# Patient Record
Sex: FEMALE | Race: BLACK OR AFRICAN AMERICAN | URBAN - METROPOLITAN AREA
[De-identification: names, ages, dates, MRNs, and addresses within clinical notes are randomized per-mention and may not be internally consistent; named-entity substitution may affect disease eponyms.]

---

## 2020-06-30 ENCOUNTER — OFFICE VISIT (OUTPATIENT)
Dept: URBAN - METROPOLITAN AREA CLINIC 92 | Facility: CLINIC | Age: 65
End: 2020-06-30

## 2023-01-06 ENCOUNTER — WEB ENCOUNTER (OUTPATIENT)
Dept: URBAN - METROPOLITAN AREA CLINIC 92 | Facility: CLINIC | Age: 68
End: 2023-01-06

## 2023-01-06 ENCOUNTER — OFFICE VISIT (OUTPATIENT)
Dept: URBAN - METROPOLITAN AREA CLINIC 92 | Facility: CLINIC | Age: 68
End: 2023-01-06
Payer: MEDICARE

## 2023-01-06 ENCOUNTER — TELEPHONE ENCOUNTER (OUTPATIENT)
Dept: URBAN - METROPOLITAN AREA CLINIC 92 | Facility: CLINIC | Age: 68
End: 2023-01-06

## 2023-01-06 VITALS
DIASTOLIC BLOOD PRESSURE: 87 MMHG | WEIGHT: 251.8 LBS | BODY MASS INDEX: 39.52 KG/M2 | HEIGHT: 67 IN | HEART RATE: 75 BPM | SYSTOLIC BLOOD PRESSURE: 152 MMHG | TEMPERATURE: 96.8 F

## 2023-01-06 DIAGNOSIS — R15.1 FECAL SMEARING: ICD-10-CM

## 2023-01-06 DIAGNOSIS — R19.7 DIARRHEA, UNSPECIFIED TYPE: ICD-10-CM

## 2023-01-06 DIAGNOSIS — Z86.19 HISTORY OF HEPATITIS C: ICD-10-CM

## 2023-01-06 PROCEDURE — 99204 OFFICE O/P NEW MOD 45 MIN: CPT

## 2023-01-06 RX ORDER — GABAPENTIN 800 MG/1
TAKE 1 TABLET (800 MG) BY ORAL ROUTE 3 TIMES PER DAY TABLET, FILM COATED ORAL
Qty: 0 | Refills: 0 | Status: ACTIVE | COMMUNITY
Start: 1900-01-01

## 2023-01-06 RX ORDER — FUROSEMIDE 20 MG/1
1 TABLET TABLET ORAL ONCE A DAY
Status: ACTIVE | COMMUNITY

## 2023-01-06 RX ORDER — TRAMADOL HYDROCHLORIDE 50 MG/1
TAKE 1 TABLET BY ORAL ROUTE EVERY 8 HOURS AS NEEDED TABLET ORAL
Qty: 0 | Refills: 0 | Status: ACTIVE | COMMUNITY
Start: 1900-01-01

## 2023-01-06 RX ORDER — RIVAROXABAN 20 MG/1
1 TABLET WITH FOOD TABLET, FILM COATED ORAL ONCE A DAY
Status: ACTIVE | COMMUNITY

## 2023-01-06 RX ORDER — LISINOPRIL 20 MG/1
TAKE 1 TABLET (20 MG) BY ORAL ROUTE ONCE DAILY TABLET ORAL 1
Qty: 0 | Refills: 0 | Status: ACTIVE | COMMUNITY
Start: 1900-01-01

## 2023-01-06 RX ORDER — HYDROXYZINE HYDROCHLORIDE 25 MG/1
TAKE 1 TABLET BY ORAL ROUTE DAILY TABLET, FILM COATED ORAL 1
Qty: 0 | Refills: 0 | Status: ACTIVE | COMMUNITY
Start: 1900-01-01

## 2023-01-06 RX ORDER — CHOLESTYRAMINE 4 G/9G
1 PACKET MIXED WITH WATER OR NON-CARBONATED DRINK POWDER, FOR SUSPENSION ORAL ONCE A DAY
Qty: 30 | Refills: 3 | OUTPATIENT
Start: 2023-01-06

## 2023-01-06 RX ORDER — APIXABAN 5 MG/1
TAKE 1 TABLET (5 MG) BY ORAL ROUTE 2 TIMES PER DAY TABLET, FILM COATED ORAL 2
Qty: 0 | Refills: 0 | Status: DISCONTINUED | COMMUNITY
Start: 1900-01-01

## 2023-01-06 NOTE — HPI-TODAY'S VISIT:
67-year-old female presents today with complaints of diarrhea and incontinence.    She states that back in June 2022 she had a colonoscopy in [place] and ever since then she has been having symptoms of diarrhea and incontinence.  She cannot tell me exactly how many bowel movements she is having but states she has been having to take 4 Imodium daily and even with this is having issues.  She denies any hematochezia, melena, nighttime symptoms, weight loss.  She states that she has fecal smearing almost every day and sometimes when she passes gas some stool will come out.  She also has a increased urgency and states all of her symptoms are getting worse over time.  She has not had any stool studies done for this.  She denies any abdominal pain, nausea, vomiting, fevers, chills.  She does note she had her gallbladder removed in 1995. June colonoscopy did demonstrate colon polyps unknown when she needs to f/u done with Dr. Kannan Nj in [place].   She does note she was diagnosed with a pancreatic mass by Dr. Kannan Nj in [place] and she is currently being monitored.  Not sure what type of mass this is but no surgical interventions have been done.  She also notes she has acid reflux and was told she had a ulcer that was found at her endoscopy that which was performed in June 2022.  She states she is currently on famotidine at bedtime and another medication that she takes in the morning.  She states she was to go back and have a repeat endoscopy performed but has yet to do so.  She denies any odynophagia, dysphagia, NSAID use, recent travel.  Of note she is currently on Xarelto due to 2 occurrences of DVT after surgical procedures.  She was following with Dr. Sandoval Rodney while she was in [place] but since she had to move back to [place] over the last few years has been following with someone up there.  She also was previously seen in 2019 by Dr. Jay and she has a history of hepatitis C that was treated with Harvoni in 2015.  Today she states she has not seen anybody for this since 2019.  She also notes she plans on staying in [place] now permanently

## 2023-01-09 LAB
IMMUNOGLOBULIN A, QN, SERUM: 142
INTERPRETATION: (no result)
T-TRANSGLUTAMINASE (TTG) IGA: <1
TSH W/REFLEX TO FT4: 2.11

## 2023-01-10 PROBLEM — 128302006: Status: ACTIVE | Noted: 2023-01-10

## 2023-01-10 PROBLEM — 443913008: Status: ACTIVE | Noted: 2023-01-10

## 2023-01-12 ENCOUNTER — OFFICE VISIT (OUTPATIENT)
Dept: URBAN - METROPOLITAN AREA CLINIC 86 | Facility: CLINIC | Age: 68
End: 2023-01-12

## 2023-01-12 RX ORDER — CHOLESTYRAMINE 4 G/9G
1 PACKET MIXED WITH WATER OR NON-CARBONATED DRINK POWDER, FOR SUSPENSION ORAL ONCE A DAY
Qty: 30 | Refills: 3 | Status: ACTIVE | COMMUNITY
Start: 2023-01-06

## 2023-01-12 RX ORDER — FUROSEMIDE 20 MG/1
1 TABLET TABLET ORAL ONCE A DAY
Status: ACTIVE | COMMUNITY

## 2023-01-12 RX ORDER — TRAMADOL HYDROCHLORIDE 50 MG/1
TAKE 1 TABLET BY ORAL ROUTE EVERY 8 HOURS AS NEEDED TABLET ORAL
Qty: 0 | Refills: 0 | Status: ACTIVE | COMMUNITY
Start: 1900-01-01

## 2023-01-12 RX ORDER — RIVAROXABAN 20 MG/1
1 TABLET WITH FOOD TABLET, FILM COATED ORAL ONCE A DAY
Status: ACTIVE | COMMUNITY

## 2023-01-12 RX ORDER — LISINOPRIL 20 MG/1
TAKE 1 TABLET (20 MG) BY ORAL ROUTE ONCE DAILY TABLET ORAL 1
Qty: 0 | Refills: 0 | Status: ACTIVE | COMMUNITY
Start: 1900-01-01

## 2023-01-12 RX ORDER — HYDROXYZINE HYDROCHLORIDE 25 MG/1
TAKE 1 TABLET BY ORAL ROUTE DAILY TABLET, FILM COATED ORAL 1
Qty: 0 | Refills: 0 | Status: ACTIVE | COMMUNITY
Start: 1900-01-01

## 2023-01-12 RX ORDER — GABAPENTIN 800 MG/1
TAKE 1 TABLET (800 MG) BY ORAL ROUTE 3 TIMES PER DAY TABLET, FILM COATED ORAL
Qty: 0 | Refills: 0 | Status: ACTIVE | COMMUNITY
Start: 1900-01-01

## 2023-01-12 NOTE — HPI-TODAY'S VISIT:
This is a 67 year old female, previously seen in our office but lost to follow up, who presents for evaluation of HCV.  A copy of the note will be sent to the referring provider  Pt treated for hcv in 2015 with harvoni and acheived svt. Last US in 2019 with homogenous liver, stable 4mm focus that was doubtful to be of clinical signficance

## 2023-03-13 ENCOUNTER — OFFICE VISIT (OUTPATIENT)
Dept: URBAN - METROPOLITAN AREA CLINIC 92 | Facility: CLINIC | Age: 68
End: 2023-03-13

## 2023-03-13 RX ORDER — TRAMADOL HYDROCHLORIDE 50 MG/1
TAKE 1 TABLET BY ORAL ROUTE EVERY 8 HOURS AS NEEDED TABLET ORAL
Qty: 0 | Refills: 0 | COMMUNITY
Start: 1900-01-01

## 2023-03-13 RX ORDER — RIVAROXABAN 20 MG/1
1 TABLET WITH FOOD TABLET, FILM COATED ORAL ONCE A DAY
COMMUNITY

## 2023-03-13 RX ORDER — LISINOPRIL 20 MG/1
TAKE 1 TABLET (20 MG) BY ORAL ROUTE ONCE DAILY TABLET ORAL 1
Qty: 0 | Refills: 0 | COMMUNITY
Start: 1900-01-01

## 2023-03-13 RX ORDER — CHOLESTYRAMINE 4 G/9G
1 PACKET MIXED WITH WATER OR NON-CARBONATED DRINK POWDER, FOR SUSPENSION ORAL ONCE A DAY
Qty: 30 | Refills: 3 | COMMUNITY
Start: 2023-01-06

## 2023-03-13 RX ORDER — CHOLESTYRAMINE 4 G/9G
1 PACKET MIXED WITH WATER OR NON-CARBONATED DRINK POWDER, FOR SUSPENSION ORAL ONCE A DAY
Qty: 30 | Refills: 3 | OUTPATIENT

## 2023-03-13 RX ORDER — FUROSEMIDE 20 MG/1
1 TABLET TABLET ORAL ONCE A DAY
COMMUNITY

## 2023-03-13 RX ORDER — HYDROXYZINE HYDROCHLORIDE 25 MG/1
TAKE 1 TABLET BY ORAL ROUTE DAILY TABLET, FILM COATED ORAL 1
Qty: 0 | Refills: 0 | COMMUNITY
Start: 1900-01-01

## 2023-03-13 RX ORDER — GABAPENTIN 800 MG/1
TAKE 1 TABLET (800 MG) BY ORAL ROUTE 3 TIMES PER DAY TABLET, FILM COATED ORAL
Qty: 0 | Refills: 0 | COMMUNITY
Start: 1900-01-01

## 2023-05-25 ENCOUNTER — OFFICE VISIT (OUTPATIENT)
Dept: URBAN - METROPOLITAN AREA MEDICAL CENTER 28 | Facility: MEDICAL CENTER | Age: 68
End: 2023-05-25

## 2024-01-29 ENCOUNTER — OFFICE VISIT (OUTPATIENT)
Dept: URBAN - METROPOLITAN AREA CLINIC 92 | Facility: CLINIC | Age: 69
End: 2024-01-29
Payer: MEDICARE

## 2024-01-29 VITALS
WEIGHT: 251 LBS | DIASTOLIC BLOOD PRESSURE: 95 MMHG | BODY MASS INDEX: 39.39 KG/M2 | TEMPERATURE: 96.8 F | HEIGHT: 67 IN | HEART RATE: 86 BPM | SYSTOLIC BLOOD PRESSURE: 164 MMHG

## 2024-01-29 DIAGNOSIS — R19.7 DIARRHEA, UNSPECIFIED TYPE: ICD-10-CM

## 2024-01-29 DIAGNOSIS — Z12.11 SCREENING FOR COLON CANCER: ICD-10-CM

## 2024-01-29 DIAGNOSIS — Z86.19 HISTORY OF HEPATITIS C: ICD-10-CM

## 2024-01-29 PROBLEM — 255046005: Status: ACTIVE | Noted: 2024-01-29

## 2024-01-29 PROCEDURE — 99213 OFFICE O/P EST LOW 20 MIN: CPT

## 2024-01-29 RX ORDER — TRAMADOL HYDROCHLORIDE 50 MG/1
TAKE 1 TABLET BY ORAL ROUTE EVERY 8 HOURS AS NEEDED TABLET ORAL
Qty: 0 | Refills: 0 | Status: ACTIVE | COMMUNITY
Start: 1900-01-01

## 2024-01-29 RX ORDER — LISINOPRIL 20 MG/1
TAKE 1 TABLET (20 MG) BY ORAL ROUTE ONCE DAILY TABLET ORAL 1
Qty: 0 | Refills: 0 | Status: ACTIVE | COMMUNITY
Start: 1900-01-01

## 2024-01-29 RX ORDER — GABAPENTIN 800 MG/1
TAKE 1 TABLET (800 MG) BY ORAL ROUTE 3 TIMES PER DAY TABLET, FILM COATED ORAL
Qty: 0 | Refills: 0 | Status: ACTIVE | COMMUNITY
Start: 1900-01-01

## 2024-01-29 RX ORDER — RIVAROXABAN 20 MG/1
1 TABLET WITH FOOD TABLET, FILM COATED ORAL ONCE A DAY
Status: ACTIVE | COMMUNITY

## 2024-01-29 RX ORDER — HYDROXYZINE HYDROCHLORIDE 25 MG/1
TAKE 1 TABLET BY ORAL ROUTE DAILY TABLET, FILM COATED ORAL 1
Qty: 0 | Refills: 0 | Status: ACTIVE | COMMUNITY
Start: 1900-01-01

## 2024-01-29 RX ORDER — FUROSEMIDE 20 MG/1
1 TABLET TABLET ORAL ONCE A DAY
Status: ACTIVE | COMMUNITY

## 2024-01-29 NOTE — HPI-TODAY'S VISIT:
1/6/23 67-year-old female presents today with complaints of diarrhea and incontinence.    She states that back in June 2022 she had a colonoscopy in [place] and ever since then she has been having symptoms of diarrhea and incontinence.  She cannot tell me exactly how many bowel movements she is having but states she has been having to take 4 Imodium daily and even with this is having issues.  She denies any hematochezia, melena, nighttime symptoms, weight loss.  She states that she has fecal smearing almost every day and sometimes when she passes gas some stool will come out.  She also has a increased urgency and states all of her symptoms are getting worse over time.  She has not had any stool studies done for this.  She denies any abdominal pain, nausea, vomiting, fevers, chills.  She does note she had her gallbladder removed in 1995. June colonoscopy did demonstrate colon polyps unknown when she needs to f/u done with Dr. Kannan Nj in [place].   She does note she was diagnosed with a pancreatic mass by Dr. Kannan Nj in [place] and she is currently being monitored.  Not sure what type of mass this is but no surgical interventions have been done.  She also notes she has acid reflux and was told she had a ulcer that was found at her endoscopy that which was performed in June 2022.  She states she is currently on famotidine at bedtime and another medication that she takes in the morning.  She states she was to go back and have a repeat endoscopy performed but has yet to do so.  She denies any odynophagia, dysphagia, NSAID use, recent travel.  Of note she is currently on Xarelto due to 2 occurrences of DVT after surgical procedures.  She was following with Dr. Sandoval Rodney while she was in [place] but since she had to move back to [place] over the last few years has been following with someone up there.  She also was previously seen in 2019 by Dr. Jay and she has a history of hepatitis C that was treated with Harvoni in 2015.  Today she states she has not seen anybody for this since 2019.  She also notes she plans on staying in [place] now permanently  1/29/24 Pt returns today after 1 year.  After last visit patient did not follow-up with liver center Labs demonstrated negative TSH and celiac. Stool study and manometry was not done. pt states that a few months after out appt she was dx with a UTI and was hospitalized. At that time she was given abx which resolved her diarrhea and incontinence. She is now having 1 BM daily with no diarrhea, constipation, hematochezia, melena, abd pain or weight loss.  She continues on Pepcid nightly for her gerd which helps.  Still have not received her old records from GI in [place].   She is following with Dr. Sandoval Rodney for diagnosis of neuroendocrine carcinoma of the pancreas. His note indicates that patient was originally diagnosed in 2021 with a CT scan that demonstrated incidental mass in the head of the pancreas 1.2 cm. She had a recent MRI in December that demonstrated a stable 1.1 cm mass in the pancreas. She was referred to Dr. Glynn for opinion on operative management. They will be continuing survey every 6 months with an MRI.

## 2024-01-30 ENCOUNTER — OFFICE VISIT (OUTPATIENT)
Dept: URBAN - METROPOLITAN AREA CLINIC 86 | Facility: CLINIC | Age: 69
End: 2024-01-30
Payer: MEDICARE

## 2024-01-30 ENCOUNTER — DASHBOARD ENCOUNTERS (OUTPATIENT)
Age: 69
End: 2024-01-30

## 2024-01-30 VITALS
TEMPERATURE: 97.2 F | HEIGHT: 67 IN | HEART RATE: 85 BPM | SYSTOLIC BLOOD PRESSURE: 135 MMHG | BODY MASS INDEX: 39.79 KG/M2 | WEIGHT: 253.5 LBS | DIASTOLIC BLOOD PRESSURE: 81 MMHG

## 2024-01-30 DIAGNOSIS — D3A.8 NEUROENDOCRINE TUMOR: ICD-10-CM

## 2024-01-30 DIAGNOSIS — B18.2 CHRONIC HEPATITIS C WITHOUT HEPATIC COMA: ICD-10-CM

## 2024-01-30 PROCEDURE — 99214 OFFICE O/P EST MOD 30 MIN: CPT | Performed by: PHYSICIAN ASSISTANT

## 2024-01-30 RX ORDER — HYDROXYZINE HYDROCHLORIDE 25 MG/1
TAKE 1 TABLET BY ORAL ROUTE DAILY TABLET, FILM COATED ORAL 1
Qty: 0 | Refills: 0 | Status: ACTIVE | COMMUNITY
Start: 1900-01-01

## 2024-01-30 RX ORDER — FUROSEMIDE 20 MG/1
1 TABLET TABLET ORAL ONCE A DAY
Status: ACTIVE | COMMUNITY

## 2024-01-30 RX ORDER — GABAPENTIN 800 MG/1
TAKE 1 TABLET (800 MG) BY ORAL ROUTE 3 TIMES PER DAY TABLET, FILM COATED ORAL
Qty: 0 | Refills: 0 | Status: ACTIVE | COMMUNITY
Start: 1900-01-01

## 2024-01-30 RX ORDER — LISINOPRIL 20 MG/1
TAKE 1 TABLET (20 MG) BY ORAL ROUTE ONCE DAILY TABLET ORAL 1
Qty: 0 | Refills: 0 | Status: ACTIVE | COMMUNITY
Start: 1900-01-01

## 2024-01-30 RX ORDER — TRAMADOL HYDROCHLORIDE 50 MG/1
TAKE 1 TABLET BY ORAL ROUTE EVERY 8 HOURS AS NEEDED TABLET ORAL
Qty: 0 | Refills: 0 | Status: ACTIVE | COMMUNITY
Start: 1900-01-01

## 2024-01-30 RX ORDER — RIVAROXABAN 20 MG/1
1 TABLET WITH FOOD TABLET, FILM COATED ORAL ONCE A DAY
Status: ACTIVE | COMMUNITY

## 2024-01-30 NOTE — HPI-TODAY'S VISIT:
This is a 67 year old female, previously seen in our office but lost to follow up, who presents for evaluation of HCV.  A copy of the note will be sent to the referring provider  Pt treated for hcv in 2015 with harvoni and dulce cevallos. Last US in 2019 with homogenous liver, stable 4mm focus that was doubtful to be of clinical signficance  12/2023 mri FINDINGS: Lower Thorax: Normal. Liver: No fat or iron. Normal.  Gallbladder/Biliary Tree: Status post cholecystectomy. No biliary ductal dilatation. Spleen: Normal. Pancreas: Well-circumscribed arterially enhancing lesion superior pancreatic head measuring 1.1 x 1.0 cm (11:40). No additional focal suspicious lesion. No main ductal dilatation. Adrenal Glands: Normal.  Kidneys/Ureters: Subcentimeter benign renal cysts bilaterally. No hydronephrosis. Gastrointestinal: Normal.  Lymph Nodes: Normal. Vessels: Susceptibly artifact from infrarenal IVC filter. 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 Peritoneum/Retroperitoneum: Normal. Bones/Soft Tissues: No suspicious osseous lesion. Lumbosacral fusion hardware. IMPRESSION: 1.  Pancreatic head enhancing lesion measuring 1.1 cm which may represent pancreatic neuroendocrine tumor. No recent prior imaging is available for comparison. 2.  No evidence of metastatic disease in the imaged abdomen.  She is pending appt with DR. Glynn re surgical opinion on the neuroendocrine tumor. SHe currently sees Dr. Rodney. They have been monitoring.  12/22/23 White blood cells 4.2, red blood cells 4.16,  hemoglobin normal at 13, platlets 203, normalThe bilirubin 0.8, alp 88 ast 18, alt 7 hcv not detected and good note.

## 2024-07-22 ENCOUNTER — LAB OUTSIDE AN ENCOUNTER (OUTPATIENT)
Dept: URBAN - METROPOLITAN AREA CLINIC 86 | Facility: CLINIC | Age: 69
End: 2024-07-22

## 2024-07-30 ENCOUNTER — OFFICE VISIT (OUTPATIENT)
Dept: URBAN - METROPOLITAN AREA CLINIC 86 | Facility: CLINIC | Age: 69
End: 2024-07-30

## 2024-08-15 ENCOUNTER — OFFICE VISIT (OUTPATIENT)
Dept: URBAN - METROPOLITAN AREA CLINIC 86 | Facility: CLINIC | Age: 69
End: 2024-08-15

## 2024-08-22 ENCOUNTER — OFFICE VISIT (OUTPATIENT)
Dept: URBAN - METROPOLITAN AREA CLINIC 86 | Facility: CLINIC | Age: 69
End: 2024-08-22

## 2024-11-26 ENCOUNTER — CLAIMS CREATED FROM THE CLAIM WINDOW (OUTPATIENT)
Dept: URBAN - METROPOLITAN AREA MEDICAL CENTER 26 | Facility: MEDICAL CENTER | Age: 69
End: 2024-11-26

## 2024-11-26 PROCEDURE — 99254 IP/OBS CNSLTJ NEW/EST MOD 60: CPT | Performed by: PHYSICIAN ASSISTANT

## 2024-11-27 ENCOUNTER — CLAIMS CREATED FROM THE CLAIM WINDOW (OUTPATIENT)
Dept: URBAN - METROPOLITAN AREA MEDICAL CENTER 26 | Facility: MEDICAL CENTER | Age: 69
End: 2024-11-27

## 2024-11-27 PROCEDURE — 99232 SBSQ HOSP IP/OBS MODERATE 35: CPT | Performed by: INTERNAL MEDICINE

## 2025-01-20 PROBLEM — 443381000124105: Status: ACTIVE | Noted: 2025-01-20

## 2025-01-20 PROBLEM — 59621000: Status: ACTIVE | Noted: 2025-01-20

## 2025-01-20 PROBLEM — 267434003: Status: ACTIVE | Noted: 2025-01-20

## 2025-01-22 ENCOUNTER — LAB OUTSIDE AN ENCOUNTER (OUTPATIENT)
Dept: URBAN - METROPOLITAN AREA TELEHEALTH 2 | Facility: TELEHEALTH | Age: 70
End: 2025-01-22

## 2025-01-22 ENCOUNTER — OFFICE VISIT (OUTPATIENT)
Dept: URBAN - METROPOLITAN AREA TELEHEALTH 2 | Facility: TELEHEALTH | Age: 70
End: 2025-01-22
Payer: COMMERCIAL

## 2025-01-22 VITALS — BODY MASS INDEX: 41.75 KG/M2 | WEIGHT: 266 LBS | HEIGHT: 67 IN

## 2025-01-22 DIAGNOSIS — B18.2 CHRONIC HEPATITIS C WITHOUT HEPATIC COMA: ICD-10-CM

## 2025-01-22 DIAGNOSIS — I10 ESSENTIAL HYPERTENSION: ICD-10-CM

## 2025-01-22 DIAGNOSIS — E66.812 CLASS 2 OBESITY: ICD-10-CM

## 2025-01-22 DIAGNOSIS — K76.0 FATTY LIVER: ICD-10-CM

## 2025-01-22 DIAGNOSIS — R77.2 ELEVATED AFP: ICD-10-CM

## 2025-01-22 DIAGNOSIS — E78.2 MIXED HYPERLIPIDEMIA: ICD-10-CM

## 2025-01-22 DIAGNOSIS — D3A.8 NEUROENDOCRINE TUMOR: ICD-10-CM

## 2025-01-22 DIAGNOSIS — Z71.85 VACCINE COUNSELING: ICD-10-CM

## 2025-01-22 DIAGNOSIS — I82.409 DVT: ICD-10-CM

## 2025-01-22 DIAGNOSIS — K52.9 CHRONIC DIARRHEA: ICD-10-CM

## 2025-01-22 PROBLEM — 236071009: Status: ACTIVE | Noted: 2025-01-22

## 2025-01-22 PROCEDURE — 99215 OFFICE O/P EST HI 40 MIN: CPT

## 2025-01-22 RX ORDER — HYDROXYZINE HYDROCHLORIDE 25 MG/1
TAKE 1 TABLET BY ORAL ROUTE DAILY TABLET, FILM COATED ORAL 1
Qty: 0 | Refills: 0 | Status: DISCONTINUED | COMMUNITY
Start: 1900-01-01

## 2025-01-22 RX ORDER — RIVAROXABAN 20 MG/1
1 TABLET WITH FOOD TABLET, FILM COATED ORAL ONCE A DAY
Status: ACTIVE | COMMUNITY

## 2025-01-22 RX ORDER — LOSARTAN POTASSIUM 100 MG/1
1 TABLET TABLET, FILM COATED ORAL ONCE A DAY
Status: ACTIVE | COMMUNITY

## 2025-01-22 RX ORDER — MULTIVITAMIN WITH IRON
AS DIRECTED TABLET ORAL
Status: ACTIVE | COMMUNITY

## 2025-01-22 RX ORDER — TRAMADOL HYDROCHLORIDE 50 MG/1
TAKE 1 TABLET BY ORAL ROUTE EVERY 8 HOURS AS NEEDED TABLET, FILM COATED ORAL
Qty: 0 | Refills: 0 | Status: ACTIVE | COMMUNITY
Start: 1900-01-01

## 2025-01-22 RX ORDER — TIZANIDINE HYDROCHLORIDE 4 MG/1
` CAPSULE, GELATIN COATED ORAL
Status: ACTIVE | COMMUNITY

## 2025-01-22 RX ORDER — LISINOPRIL 40 MG/1
1 TABLET TABLET ORAL ONCE A DAY
Refills: 0 | Status: DISCONTINUED | COMMUNITY
Start: 1900-01-01

## 2025-01-22 RX ORDER — HYDRALAZINE HYDROCHLORIDE 25 MG/1
1 TABLET WITH FOOD TABLET, FILM COATED ORAL TWICE A DAY
Status: ACTIVE | COMMUNITY

## 2025-01-22 RX ORDER — GABAPENTIN 800 MG/1
TAKE 1 TABLET (800 MG) BY ORAL ROUTE 3 TIMES PER DAY TABLET, FILM COATED ORAL
Qty: 0 | Refills: 0 | Status: ACTIVE | COMMUNITY
Start: 1900-01-01

## 2025-01-22 RX ORDER — FUROSEMIDE 20 MG/1
1 TABLET TABLET ORAL ONCE A DAY
Status: DISCONTINUED | COMMUNITY

## 2025-01-22 NOTE — HPI-TODAY'S VISIT:
This is a 69 year old female, previously seen in our office but lost to follow up, and last seen Jan 2024 and now returns for further evaluation of HCV hx.  A copy of the note will be sent to the referring provider  Updated [place] cancer records from January 13 2025 seen as a patient had been admitted to [facility] and then followed up with the [place] cancer team.  She had a small bowel obstruction episode during that admission.   She has had several abdominal surgery including hysterectomy, appendectomy, and cholecystectomy.  So the small bowel obstruction was all related due to adhesions from same. [facility] mission for small bowel obstruction was suspected and managed with conservative means.  She was seeing a primary care provider at [place] wanted to be switched here [facility].  She does have [place] HMO and Medicare.  Dr Leiva to see for primary care.  She had a recurrent DVT that was noted and has been on Xarelto therapy with no problems with bleeding.  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  She has an MRI scheduled for February 5, 2025 of her abdomen.  She is seeing the [place] spine team for her cervical radiculopathy for 2 herniated discs. Some mixed response has been noted to the epidural steroids.  She has been having problems with her weight and was interested in weight loss therapy and was afraid to use the GLP-1 agonist due to was going on with her pancreas neuroendocrine.  Patient noted to be in remission since 2014 from her hep C with the usage of Harvoni therapy.  With regards to her neuroendocrine tumor work up of the pancreas this was noted at [facility] in January 2021 and she has shortness of breath.  They did a CT pulmonary embolism protocol and found incidental mass at 1.2 cm in head of the pancreas.  MRI confirmed the 11 mm lesion no suspicious for new tumor EUS was done at [place] and it was noted to be hypoechoic mass that was biopsied and was well-differentiated neuroendocrine tumor.  Tissue was scant.  Gallium test with was done showing there was a somatostatin avid tumor without evidence of metastatic disease.   She was seen by Dr. Nj at the [facility] there and was being considered for surgery but she continued surveillance due to small size.   Her mother became ill and she did not follow-up at the [facility] and then subsequent mother passed that she moved back to [place].  She had previously reported losing about 30 pounds.  Medicines currently listed as hydralazine 25 mg twice a day, tizanidine 4 mg 3 times a day, lisinopril 40 mg a day, gabapentin 800 mg 3 times a day, vitamin D thousand is a day, tramadol 50 mg every 6 hours.,  Xarelto 20 mg at night.  Weight was 266.6 BMI 41.75 for class III obesity noted.  Jan 2025 weight 266 and had lost 30 on medication.  Jan 2025 WBC 2.9 hemoglobin 12.6 platelet 177 neutrophils 1.4.  Patient will be seen by Dr. Shaikh shortly after the February 5, 2025 MRI and they are seeing her annually.  She has seen  for epidural steroid injections.  They had asked her to follow-up with us [facility] liver perspective.  They are maintaining her on her Xarelto for her DVT prophylaxis.  She has Zayas antigen no neutropenia is a chronic finding.  Last MRI we saw was from January 29, 2024 which showed the pancreatic head enhancing lesion measuring 1.1 cm which is a represented pancreatic neuroendocrine tumor.  Liver showed no fat or iron and was normal otherwise.  Pt treated for hcv in 2015 with harvoni and dulce svt. Last US in 2019 with homogenous liver, stable 4mm focus that was doubtful to be of clinical signficance  12/2023 mri FINDINGS: Lower Thorax: Normal. Liver: No fat or iron. Normal.  Gallbladder/Biliary Tree: Status post cholecystectomy. No biliary ductal dilatation. Spleen: Normal. Pancreas: Well-circumscribed arterially enhancing lesion superior pancreatic head measuring 1.1 x 1.0 cm (11:40). No additional focal suspicious lesion. No main ductal dilatation. Adrenal Glands: Normal.  Kidneys/Ureters: Subcentimeter benign renal cysts bilaterally. No hydronephrosis. Gastrointestinal: Normal.  Lymph Nodes: Normal. Vessels: Susceptibly artifact from infrarenal IVC filter. 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 Peritoneum/Retroperitoneum: Normal. Bones/Soft Tissues: No suspicious osseous lesion. Lumbosacral fusion hardware. IMPRESSION: 1.  Pancreatic head enhancing lesion measuring 1.1 cm which may represent pancreatic neuroendocrine tumor. No recent prior imaging is available for comparison. 2.  No evidence of metastatic disease in the imaged abdomen.  She is pending appt with DR. Glynn re surgical opinion on the neuroendocrine tumor. SHe currently sees Dr. Rodney. They have been monitoring.  12/22/23 White blood cells 4.2, red blood cells 4.16,  hemoglobin normal at 13, platlets 203, normalThe bilirubin 0.8, alp 88 ast 18, alt 7 hcv not detected and good note.  Plan: 1. Liver appears to be doing well post hcv tx and no clear fibrosis signs. 2. Pt is being watched carefully re neuroendocrine tumor and not on any tx and feb mri pending. 3. RTC in 6m to see how doing and to follow along if doing any tx, 4. She will do well combined centralized care.  Duration of visit was 40minutes total with 20 minutes spent preparing chart, and loading [place] cancer notes and [place] notes and older visit information into [facility] with additional 20 minutes [facility] with time spent reviewing patient's chart, notes, labs and discussing treatment plan with time spent discussing plans with pt.

## 2025-01-22 NOTE — EXAM-PHYSICAL EXAM
Gen: no distress.  Eyes: no jaundice.  Mouth: no thrush.  CV: no chest pain.  Resp: no wheezes.  Abd: no pain.  Ext: no edema.  Neuro: no weakness

## 2025-01-27 ENCOUNTER — OFFICE VISIT (OUTPATIENT)
Dept: URBAN - METROPOLITAN AREA CLINIC 92 | Facility: CLINIC | Age: 70
End: 2025-01-27

## 2025-07-22 ENCOUNTER — OFFICE VISIT (OUTPATIENT)
Dept: URBAN - METROPOLITAN AREA CLINIC 86 | Facility: CLINIC | Age: 70
End: 2025-07-22

## 2025-07-22 NOTE — HPI-TODAY'S VISIT:
This is a 69 year old female, previously seen in our office and not for a few years and now back and last seen Jan 2025  for further evaluation of HCV hx.  A copy of the note will be sent to the referring provider  Updated [place] cancer records from January 13 2025 seen as a patient had been admitted to [facility] and then followed up with the [place] cancer team.  She had a small bowel obstruction episode during that admission.   She has had several abdominal surgery including hysterectomy, appendectomy, and cholecystectomy.  So the small bowel obstruction was all related due to adhesions from same. [facility] mission for small bowel obstruction was suspected and managed with conservative means.  She was seeing a primary care provider at [place] wanted to be switched here [facility].  She does have [place] HMO and Medicare.  Dr Leiva to see for primary care.  She had a recurrent DVT that was noted and has been on Xarelto therapy with no problems with bleeding.  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  She has an MRI scheduled for February 5, 2025 of her abdomen.  She is seeing the [place] spine team for her cervical radiculopathy for 2 herniated discs. Some mixed response has been noted to the epidural steroids.  She has been having problems with her weight and was interested in weight loss therapy and was afraid to use the GLP-1 agonist due to was going on with her pancreas neuroendocrine.  Patient noted to be in remission since 2014 from her hep C with the usage of Harvoni therapy.  With regards to her neuroendocrine tumor work up of the pancreas this was noted at [facility] in January 2021 and she has shortness of breath.  They did a CT pulmonary embolism protocol and found incidental mass at 1.2 cm in head of the pancreas.  MRI confirmed the 11 mm lesion no suspicious for new tumor EUS was done at [place] and it was noted to be hypoechoic mass that was biopsied and was well-differentiated neuroendocrine tumor.  Tissue was scant.  Gallium test with was done showing there was a somatostatin avid tumor without evidence of metastatic disease.   She was seen by Dr. Nj at the [facility] there and was being considered for surgery but she continued surveillance due to small size.   Her mother became ill and she did not follow-up at the [facility] and then subsequent mother passed that she moved back to [place].  She had previously reported losing about 30 pounds.  Medicines currently listed as hydralazine 25 mg twice a day, tizanidine 4 mg 3 times a day, lisinopril 40 mg a day, gabapentin 800 mg 3 times a day, vitamin D thousand is a day, tramadol 50 mg every 6 hours.,  Xarelto 20 mg at night.  Weight was 266.6 BMI 41.75 for class III obesity noted.  Jan 2025 weight 266 and had lost 30 on medication.  Jan 2025 WBC 2.9 hemoglobin 12.6 platelet 177 neutrophils 1.4.  Patient will be seen by Dr. Shaikh shortly after the February 5, 2025 MRI and they are seeing her annually.  She has seen  for epidural steroid injections.  They had asked her to follow-up with us [facility] liver perspective.  They are maintaining her on her Xarelto for her DVT prophylaxis.  She has Zayas antigen no neutropenia is a chronic finding.  Last MRI we saw was from January 29, 2024 which showed the pancreatic head enhancing lesion measuring 1.1 cm which is a represented pancreatic neuroendocrine tumor.  Liver showed no fat or iron and was normal otherwise.  Pt treated for hcv in 2015 with harvoni and dulce svt. Last US in 2019 with homogenous liver, stable 4mm focus that was doubtful to be of clinical signficance  12/2023 mri FINDINGS: Lower Thorax: Normal. Liver: No fat or iron. Normal.  Gallbladder/Biliary Tree: Status post cholecystectomy. No biliary ductal dilatation. Spleen: Normal. Pancreas: Well-circumscribed arterially enhancing lesion superior pancreatic head measuring 1.1 x 1.0 cm (11:40). No additional focal suspicious lesion. No main ductal dilatation. Adrenal Glands: Normal.  Kidneys/Ureters: Subcentimeter benign renal cysts bilaterally. No hydronephrosis. Gastrointestinal: Normal.  Lymph Nodes: Normal. Vessels: Susceptibly artifact from infrarenal IVC filter. 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 Peritoneum/Retroperitoneum: Normal. Bones/Soft Tissues: No suspicious osseous lesion. Lumbosacral fusion hardware. IMPRESSION: 1.  Pancreatic head enhancing lesion measuring 1.1 cm which may represent pancreatic neuroendocrine tumor. No recent prior imaging is available for comparison. 2.  No evidence of metastatic disease in the imaged abdomen.  She is pending appt with DR. Glynn re surgical opinion on the neuroendocrine tumor. SHe currently sees Dr. Rodney. They have been monitoring.  12/22/23 White blood cells 4.2, red blood cells 4.16,  hemoglobin normal at 13, platlets 203, normalThe bilirubin 0.8, alp 88 ast 18, alt 7 hcv not detected and good note.  Plan: 1. Liver appears to be doing well post hcv tx and no clear fibrosis signs. 2. Pt is being watched carefully re neuroendocrine tumor and not on any tx and feb mri pending. 3. RTC in 6m to see how doing and to follow along if doing any tx, 4. She will do well combined centralized care.  Duration of visit was minutes total with 20 minutes spent preparing chart, and loading [place] cancer notes and [place] notes and older visit information into [facility] with additional 20 minutes spent for this [facility] TeleMed visit with time spent reviewing patient's chart, notes, labs and discussing treatment plan with time spent discussing plans with pt.